# Patient Record
Sex: MALE | Race: WHITE | Employment: FULL TIME | URBAN - NONMETROPOLITAN AREA
[De-identification: names, ages, dates, MRNs, and addresses within clinical notes are randomized per-mention and may not be internally consistent; named-entity substitution may affect disease eponyms.]

---

## 2023-02-03 ENCOUNTER — HOSPITAL ENCOUNTER (OUTPATIENT)
Dept: NON INVASIVE DIAGNOSTICS | Age: 43
Discharge: HOME | End: 2023-02-03
Payer: COMMERCIAL

## 2023-02-03 LAB
LV EF: 60 %
LVEF MODALITY: NORMAL

## 2023-02-03 PROCEDURE — 93306 TTE W/DOPPLER COMPLETE: CPT

## 2023-02-20 ENCOUNTER — OFFICE VISIT (OUTPATIENT)
Dept: UROLOGY | Age: 43
End: 2023-02-20
Payer: COMMERCIAL

## 2023-02-20 VITALS
HEIGHT: 69 IN | SYSTOLIC BLOOD PRESSURE: 100 MMHG | DIASTOLIC BLOOD PRESSURE: 72 MMHG | WEIGHT: 173 LBS | BODY MASS INDEX: 25.62 KG/M2

## 2023-02-20 DIAGNOSIS — R97.20 ELEVATED PSA: Primary | ICD-10-CM

## 2023-02-20 PROCEDURE — 99204 OFFICE O/P NEW MOD 45 MIN: CPT | Performed by: UROLOGY

## 2023-02-20 RX ORDER — OMEPRAZOLE 20 MG/1
20 CAPSULE, DELAYED RELEASE ORAL DAILY
COMMUNITY

## 2023-02-20 NOTE — PROGRESS NOTES
Pilar Jackson MD.    [place] [address]  [address]  [address]  [place] [address]  Dept: [phone]  Dept Fax: [phone] [address] [facility] Urology Office Note -     Patient:  Angella Larry  YOB: 1980    The patient is a 40 y.o. male who presents today for evaluation of the following problems:   Chief Complaint   Patient presents with    Elevated PSA     Elevated PSA referral Benjamin Dears  No issues or concerns at this time    referred/consultation requested by ROBERT Montenegro. History of Present Illness:    Elevated PSA  No luts  No family hx  Two elevations > 4  Was normal in 2021  No hematuria        Requested/reviewed records from Redd Montenegro office and/or outside physician/EMR    (Patient's old records have been requested, reviewed and pertinent findings summarized in today's note.)    Procedures Today: N/A      Last several PSA's:  No results found for: PSA    Last total testosterone:  No results found for: TESTOSTERONE    Urinalysis today:  No results found for this visit on 02/20/23. Last BUN and creatinine:  No results found for: BUN  No results found for: CREATININE      Imaging Reviewed during this Office Visit:   Kinsey Banda MD independently reviewed the images and verified the radiology reports from:    No results found. PAST MEDICAL, FAMILY AND SOCIAL HISTORY:  No past medical history on file. No past surgical history on file. No family history on file. Outpatient Medications Marked as Taking for the 2/20/23 encounter (Office Visit) with Araseli Boyer MD   Medication Sig Dispense Refill    omeprazole (PRILOSEC) 20 MG delayed release capsule Take 20 mg by mouth daily         Patient has no known allergies.   Social History     Tobacco Use   Smoking Status Every Day    Packs/day: 0.25    Years: 23.00    Pack years: 5.75    Types: Cigarettes   Smokeless Tobacco Never (If patient a smoker, smoking cessation counseling offered)   Social History     Substance and Sexual Activity   Alcohol Use Not Currently       REVIEW OF SYSTEMS:  Constitutional: negative  Eyes: negative  Respiratory: negative  Cardiovascular: negative  Gastrointestinal: negative  Genitourinary: see HPI  Musculoskeletal: negative  Skin: negative   Neurological: negative  Hematological/Lymphatic: negative  Psychological: negative      Physical Exam:    This a 40 y.o. male  Vitals:    02/20/23 0920   BP: 100/72     Body mass index is 25.55 kg/m². Constitutional: Patient in no acute distress;       Assessment and Plan        1. Elevated PSA               Plan:      Elevated PSA- high for his age. labs were done at [facility]. Discussed mri. Will order prostate MRI for prostate biopsy localization    F/u with results      Prescriptions Ordered:  No orders of the defined types were placed in this encounter. Orders Placed:  No orders of the defined types were placed in this encounter.            Geneva Brito MD

## 2023-03-17 ENCOUNTER — HOSPITAL ENCOUNTER (OUTPATIENT)
Dept: MRI IMAGING | Age: 43
Discharge: HOME | End: 2023-03-17
Payer: COMMERCIAL

## 2023-03-17 DIAGNOSIS — R97.20 ELEVATED PSA: ICD-10-CM

## 2023-03-17 PROCEDURE — 72197 MRI PELVIS W/O & W/DYE: CPT

## 2023-03-17 PROCEDURE — 6360000004 HC RX CONTRAST MEDICATION: Performed by: UROLOGY

## 2023-03-17 PROCEDURE — A9579 GAD-BASE MR CONTRAST NOS,1ML: HCPCS | Performed by: UROLOGY

## 2023-03-17 PROCEDURE — 2580000003 HC RX 258: Performed by: UROLOGY

## 2023-03-17 RX ORDER — 0.9 % SODIUM CHLORIDE 0.9 %
30 INTRAVENOUS SOLUTION INTRAVENOUS ONCE
Status: DISCONTINUED | OUTPATIENT
Start: 2023-03-17 | End: 2023-03-20 | Stop reason: HOSPADM

## 2023-03-17 RX ORDER — SODIUM CHLORIDE 0.9 % (FLUSH) 0.9 %
10 SYRINGE (ML) INJECTION PRN
Status: DISCONTINUED | OUTPATIENT
Start: 2023-03-17 | End: 2023-03-20 | Stop reason: HOSPADM

## 2023-03-17 RX ADMIN — GADOTERIDOL 16 ML: 279.3 INJECTION, SOLUTION INTRAVENOUS at 11:15

## 2023-03-17 RX ADMIN — SODIUM CHLORIDE, PRESERVATIVE FREE 10 ML: 5 INJECTION INTRAVENOUS at 11:15

## 2023-03-17 RX ADMIN — Medication 30 ML: at 11:16

## 2023-03-27 ENCOUNTER — OFFICE VISIT (OUTPATIENT)
Dept: UROLOGY | Age: 43
End: 2023-03-27
Payer: COMMERCIAL

## 2023-03-27 VITALS
OXYGEN SATURATION: 97 % | SYSTOLIC BLOOD PRESSURE: 108 MMHG | HEIGHT: 69 IN | WEIGHT: 180 LBS | RESPIRATION RATE: 12 BRPM | HEART RATE: 77 BPM | BODY MASS INDEX: 26.66 KG/M2 | DIASTOLIC BLOOD PRESSURE: 84 MMHG

## 2023-03-27 DIAGNOSIS — R97.20 ELEVATED PSA: Primary | ICD-10-CM

## 2023-03-27 PROCEDURE — 99214 OFFICE O/P EST MOD 30 MIN: CPT | Performed by: UROLOGY

## 2023-03-27 NOTE — PROGRESS NOTES
Medication Sig Dispense Refill    omeprazole (PRILOSEC) 20 MG delayed release capsule Take 20 mg by mouth daily         Patient has no known allergies. Social History     Tobacco Use   Smoking Status Every Day    Packs/day: 0.25    Years: 23.00    Pack years: 5.75    Types: Cigarettes    Start date: 1/1/1997   Smokeless Tobacco Never      (If patient a smoker, smoking cessation counseling offered)   Social History     Substance and Sexual Activity   Alcohol Use Yes    Comment: occasionally       REVIEW OF SYSTEMS:  Constitutional: negative  Eyes: negative  Respiratory: negative  Cardiovascular: negative  Gastrointestinal: negative  Genitourinary: see HPI  Musculoskeletal: negative  Skin: negative   Neurological: negative  Hematological/Lymphatic: negative  Psychological: negative      Physical Exam:    This a 40 y.o. male  Vitals:    03/27/23 0950   BP: 108/84   Pulse: 77   Resp: 12   SpO2: 97%     Body mass index is 26.58 kg/m². Constitutional: Patient in no acute distress;       Assessment and Plan        1. Elevated PSA                 Plan:      Elevated PSA- high for his age. labs were done at [facility]. Discussed mri. Pir[place] 4. Needs fusion guided biopsy st v         Prescriptions Ordered:  No orders of the defined types were placed in this encounter. Orders Placed:  No orders of the defined types were placed in this encounter.            Troy Diaz MD

## 2023-04-03 ENCOUNTER — OFFICE VISIT (OUTPATIENT)
Dept: CARDIOLOGY | Age: 43
End: 2023-04-03
Payer: COMMERCIAL

## 2023-04-03 VITALS
DIASTOLIC BLOOD PRESSURE: 58 MMHG | SYSTOLIC BLOOD PRESSURE: 106 MMHG | BODY MASS INDEX: 26.05 KG/M2 | HEART RATE: 78 BPM | WEIGHT: 176.4 LBS

## 2023-04-03 DIAGNOSIS — R42 DIZZINESS: ICD-10-CM

## 2023-04-03 DIAGNOSIS — R07.9 CHEST PAIN, MODERATE CORONARY ARTERY RISK: ICD-10-CM

## 2023-04-03 DIAGNOSIS — R00.0 TACHYCARDIA: ICD-10-CM

## 2023-04-03 DIAGNOSIS — Z76.89 ESTABLISHING CARE WITH NEW DOCTOR, ENCOUNTER FOR: Primary | ICD-10-CM

## 2023-04-03 PROCEDURE — 93000 ELECTROCARDIOGRAM COMPLETE: CPT | Performed by: INTERNAL MEDICINE

## 2023-04-03 PROCEDURE — 99204 OFFICE O/P NEW MOD 45 MIN: CPT | Performed by: INTERNAL MEDICINE

## 2023-04-03 NOTE — PROGRESS NOTES
Sinus  Rhythm   Low voltage in limb leads. ECHO:   Results for orders placed or performed during the hospital encounter of 02/03/23   ECHO Complete 2D W Doppler W Color  Normal left ventricular diameter. Left ventricular systolic function is normal.  Left ventricular ejection fraction 60 %. No doppler evidence of diastolic dysfunction. No significant valvular regurgitation or stenosis seen. No pericardial effusion. 2 Week Monitor- 2/23  - Sinus, [address], low of 31, sinus tachy  - 11 beat run of Vtach  - Rare PAC/PVC  - Doubt SVT that was commented on interpretation   - Possible 2nd degree AVB type 1    Past Medical and Surgical History, Problem List, Allergies, Medications, Labs, Imaging, all reviewed extensively in EMR and with the patient. Assessment:  - Ventricular Tachy - on 2 week monitor- 11 beats  - SOB- may be due to COPD?  - Smoker  - Low risk ECG  - Low risk Echo 2/23    Plan:  - no BB due to low heart rate of 31 on monitor  - will check Lexiscan stress test to rule out ischemia/further risk stratify   - he states he cant walk on treadmill due to SOB    The patient is to continue heart healthy diet, weight loss and exercise as tolerated. Patient's medications and side effects were discussed. Medication refills were provided if needed. Follow up appointment timing was discussed. All questions and concerns were addressed to patient's satisfaction. The patient is to follow up in 6 months or sooner if necessary. Thank you for allowing me to participate in the care of this patient, please do not hesitate to call if you have any questions. Lay Escalera DO, [address], [address], [address], [place] [facility]  [facility]. [facility]  [phone]

## 2023-04-20 ENCOUNTER — HOSPITAL ENCOUNTER (OUTPATIENT)
Dept: NON INVASIVE DIAGNOSTICS | Age: 43
Discharge: HOME | End: 2023-04-20
Payer: COMMERCIAL

## 2023-04-20 ENCOUNTER — HOSPITAL ENCOUNTER (OUTPATIENT)
Dept: NUCLEAR MEDICINE | Age: 43
Discharge: HOME | End: 2023-04-22
Payer: COMMERCIAL

## 2023-04-20 DIAGNOSIS — R42 DIZZINESS: ICD-10-CM

## 2023-04-20 DIAGNOSIS — R00.0 TACHYCARDIA: ICD-10-CM

## 2023-04-20 DIAGNOSIS — Z76.89 ESTABLISHING CARE WITH NEW DOCTOR, ENCOUNTER FOR: ICD-10-CM

## 2023-04-20 DIAGNOSIS — R07.9 CHEST PAIN, MODERATE CORONARY ARTERY RISK: ICD-10-CM

## 2023-04-20 PROCEDURE — 78452 HT MUSCLE IMAGE SPECT MULT: CPT

## 2023-04-20 PROCEDURE — A9500 TC99M SESTAMIBI: HCPCS | Performed by: INTERNAL MEDICINE

## 2023-04-20 PROCEDURE — 3430000000 HC RX DIAGNOSTIC RADIOPHARMACEUTICAL: Performed by: INTERNAL MEDICINE

## 2023-04-20 PROCEDURE — 93017 CV STRESS TEST TRACING ONLY: CPT

## 2023-04-20 RX ORDER — TETRAKIS(2-METHOXYISOBUTYLISOCYANIDE)COPPER(I) TETRAFLUOROBORATE 1 MG/ML
30 INJECTION, POWDER, LYOPHILIZED, FOR SOLUTION INTRAVENOUS
Status: COMPLETED | OUTPATIENT
Start: 2023-04-20 | End: 2023-04-20

## 2023-04-20 RX ORDER — TETRAKIS(2-METHOXYISOBUTYLISOCYANIDE)COPPER(I) TETRAFLUOROBORATE 1 MG/ML
10 INJECTION, POWDER, LYOPHILIZED, FOR SOLUTION INTRAVENOUS
Status: COMPLETED | OUTPATIENT
Start: 2023-04-20 | End: 2023-04-20

## 2023-04-20 RX ADMIN — Medication 30 MILLICURIE: at 14:05

## 2023-04-20 RX ADMIN — Medication 10 MILLICURIE: at 13:00

## 2023-04-20 NOTE — PROGRESS NOTES
Patient Name:  Neda Stockton MRN:  [id number]   :  1980  Age:  40 y.o. Sex: male     Ordering Provider: Noa Brumfield DO      Primary Care Provider:  ROBERT Encarnacion     Indications: dizziness, tachycardia, chest pain   Medications:     Current Outpatient Medications:     Aromatic Inhalants (INHALER DECONGESTANT IN), Inhale into the lungs, Disp: , Rfl:     omeprazole (PRILOSEC) 20 MG delayed release capsule, Take 1 capsule by mouth daily, Disp: , Rfl:   No current facility-administered medications for this encounter.     Facility-Administered Medications Ordered in Other Encounters:     technetium sestamibi (CARDIOLITE) injection 10 millicurie, 10 millicurie, IntraVENous, ONCE PRN, Bartolome Bassett DO    technetium sestamibi (CARDIOLITE) injection 30 millicurie, 30 millicurie, IntraVENous, ONCE PRN, Bartolome Bassett DO     ------------------------------------------------------------------------------------------------    Predicted Heart Rate:  Maximum:  179   85%:  153     Heart Rate Restin   Standin:     Blood Pressure Restin/76  Standin/70     Exercise Protocol Used:  SHANTA   Exercise Duration/Stage:  10:30 (Stage IV)     Test terminated due to: target heart rate achieved, dyspnea Imaging: Cardiolite     Maximum Heart Rate:  164 (91%) Max BP: 140/80  Max Workload: 12.50 METS     Chest Pain: No  Onset:  n/a  Severity:  n/a     Electronically signed by Audelia Hebert RCP on 23 at 1:45 PM EDT    -------------------------------------------------------------------------------------------------  Baseline EKG : Normal sinus rhythm normal ECG    EKG Changes: No significant ST-T abnormality on peak exercise to suggest ischemia    Maximum Change:   N/a    Leads with maximum changes:  n/a      Arrhythmias: none      Interpretation:    -Good functional/exercise capacity  -Negative ECG stress test for ischemia   -Nuclear scan report to follow       Supervising Physician:  Juana Landaverde MD

## 2023-04-24 ENCOUNTER — TELEPHONE (OUTPATIENT)
Dept: CARDIOLOGY | Age: 43
End: 2023-04-24

## 2023-04-24 DIAGNOSIS — R94.39 ABNORMAL STRESS TEST: Primary | ICD-10-CM

## 2023-04-24 NOTE — TELEPHONE ENCOUNTER
Please review abnormal stress results and advise. LM for pt to call office for results      [address], [address] [place] [address]                               CARDIAC STRESS TEST     PATIENT NAME: Robyn Galicia                         :        1980  MED REC NO:   [id number]                             ROOM:  ACCOUNT NO:   [de-identified]                           ADMIT DATE: 2023  PROVIDER:     Franky Abreu DO     DATE OF STUDY:  2023     STRESS TEST     Ordering Provider:  Franky Abreu DO     Primary Care Provider:  ROBERT Stallings     Patient's Age:  40   Height:  5 feet, 9 inches    Weight:  176 pounds     INDICATION:  Dizziness, tachycardia, chest pain, shortness of breath. Predicted Heart Rate: Maximum:  179     85%:  153     Heart Rate Restin       Standin     Blood pressure restin/76    Standin/70     Exercise protocol used:  Ramirez  Exercise Duration/Stage:  10:30; stage IV     Test terminated due to: Target heart rate achieved, dyspnea. Imaging: Cardiolite     Maximum heart rate:  164  (91%)  Max blood pressure:  140/80  Max Workload:  12.50 METS     Chest Pain:  No.     Baseline EKG:  Normal sinus rhythm, normal ECG. EKG changes:  No significant ST-T abnormal on peak exercise to suggest  ischemia. Arrhythmias:  None. Interpretation:  1. Good functional/exercise capacity. 2.  Negative ECG stress test for ischemia. 3.  Nuclear scan report to follow. Nuclear Myocardial Perfusion Imaging (SPECT)     TESTING METHOD  STRESS:   Treadmill  AGENT:    Cardiolite  REST:          Injection Date:  23  Time:  1300  Amount:  10.6 mCi  STRESS:   Injection Date:  23  Time:  1405  Amount:  34.5 mCi  IMAGE TIME:    Rest:  1331  Stress:  1431     EF:  49%       TID:  0.96     LHR:  0.58     FINDINGS:  Ischemia (Reversible Defect):           Yes.   Infarction (Irreversible Defect):       No.  Normal

## 2023-04-25 PROBLEM — Z76.89 ESTABLISHING CARE WITH NEW DOCTOR, ENCOUNTER FOR: Status: ACTIVE | Noted: 2023-04-25

## 2023-04-25 NOTE — TELEPHONE ENCOUNTER
The following orders will be implemented. Orders with a [] are choices and are NOT implemented unless the box is checked. Pre-Procedure Orders    Reji Rahman     1980     Diagnosis: Abnormal stress, VTach, low EF      Procedure scheduled for:   Procedure ordered was discussed with the patient  including risks, benefits and alternative therapies. [] Left Heart Catheterization and coronary angiography    [x] Left Heart Catheterization and coronary angiography w/possible PCI/Coronary Stent PCI    [] Right Heart Catheterization  [] With Flolan  [] Aortic Root    [] DINO  [] Cardioversion    [] EPS  [] Tilt Table Study per guidelines  [] With Isuprel  [] With NTG    [] Without medication    [] ILR[]    ICD: [] Replacement  [] New implant  [] Lead Extraction    Permanent Pacemaker:  [] Replacement  [] New Implant    [] Lead Extraction    Irrigation:  [] Vancomycin 1 gram ( Check robson for PPM/ICD)    Ablation:   [] CRYO  [] SVT  [] A-Fib  [] A-Flutter  [] PVC  [] VT    [] With Anesthesia  [] W/O Anesthesia [] With DURAN  [] W/O DURAN     [] With CARTO  [] W/O CARTO    ORDERS    [x] EKG   [x] Point of care testing  [] Urine Pregnancy  [] PLT    [x] PT/INR if on Coumadin  [x] NA, K, Glucose, CL, Creat, Calc, HBG/HCT    IV:  [x] Start Peripheral IV: [x] N/S at 75    ml/hr. [] D5 1/2 N/S at     ml/hr. Patient has history of contrast reaction; give below meds as prophylaxis 30-60 min prior to procedure:    [] Benadryl (diphenhydramine) 25 mg IV x 1 dose    [] Benadryl (diphenhydramine) 50 mg IV x 1 dose    [] Solu-Medrol (methylprednisolone) 125 mg IV x 1 dose    [] Hydrocortisone (SOLU-CORTEF) 100 mg. IV x 1 dose    [] Lidocaine 1% 0.4 ml subq for IV start    Electronically signed by provider ________________  Talib Sensor, DO, [address], [address], [address], [place] [facility]  [facility]. [facility]  [phone]  04/25/23

## 2023-05-09 ENCOUNTER — HOSPITAL ENCOUNTER (OUTPATIENT)
Dept: CARDIAC CATH/INVASIVE PROCEDURES | Age: 43
Discharge: HOME | End: 2023-05-09
Payer: COMMERCIAL

## 2023-05-09 VITALS
OXYGEN SATURATION: 97 % | SYSTOLIC BLOOD PRESSURE: 108 MMHG | RESPIRATION RATE: 21 BRPM | HEART RATE: 63 BPM | DIASTOLIC BLOOD PRESSURE: 78 MMHG

## 2023-05-09 LAB
EGFR, POC: >60 ML/MIN/1.73M2
GLUCOSE BLD-MCNC: 113 MG/DL (ref 74–100)
LV EF: 50 %
LVEF MODALITY: NORMAL
PLATELET # BLD AUTO: 271 K/UL (ref 138–453)
POC BUN: 14 MG/DL (ref 8–26)
POC CHLORIDE: 106 MMOL/L (ref 98–107)
POC CREATININE: 0.98 MG/DL (ref 0.51–1.19)
POC HEMATOCRIT: 47 % (ref 41–53)
POC HEMOGLOBIN: 16 G/DL (ref 13.5–17.5)
POC POTASSIUM: 4 MMOL/L (ref 3.5–4.5)
POC SODIUM: 144 MMOL/L (ref 138–146)

## 2023-05-09 PROCEDURE — 2500000003 HC RX 250 WO HCPCS

## 2023-05-09 PROCEDURE — 84132 ASSAY OF SERUM POTASSIUM: CPT

## 2023-05-09 PROCEDURE — 84295 ASSAY OF SERUM SODIUM: CPT

## 2023-05-09 PROCEDURE — 85014 HEMATOCRIT: CPT

## 2023-05-09 PROCEDURE — 7100000011 HC PHASE II RECOVERY - ADDTL 15 MIN

## 2023-05-09 PROCEDURE — 85049 AUTOMATED PLATELET COUNT: CPT

## 2023-05-09 PROCEDURE — C1894 INTRO/SHEATH, NON-LASER: HCPCS

## 2023-05-09 PROCEDURE — 93458 L HRT ARTERY/VENTRICLE ANGIO: CPT

## 2023-05-09 PROCEDURE — 6360000002 HC RX W HCPCS

## 2023-05-09 PROCEDURE — 82435 ASSAY OF BLOOD CHLORIDE: CPT

## 2023-05-09 PROCEDURE — 82947 ASSAY GLUCOSE BLOOD QUANT: CPT

## 2023-05-09 PROCEDURE — 2580000003 HC RX 258: Performed by: INTERNAL MEDICINE

## 2023-05-09 PROCEDURE — 7100000010 HC PHASE II RECOVERY - FIRST 15 MIN

## 2023-05-09 PROCEDURE — 84520 ASSAY OF UREA NITROGEN: CPT

## 2023-05-09 PROCEDURE — 6360000004 HC RX CONTRAST MEDICATION

## 2023-05-09 PROCEDURE — 2709999900 HC NON-CHARGEABLE SUPPLY

## 2023-05-09 PROCEDURE — 82565 ASSAY OF CREATININE: CPT

## 2023-05-09 RX ORDER — SODIUM CHLORIDE 9 MG/ML
INJECTION, SOLUTION INTRAVENOUS CONTINUOUS
Status: DISCONTINUED | OUTPATIENT
Start: 2023-05-09 | End: 2023-05-10 | Stop reason: HOSPADM

## 2023-05-09 RX ADMIN — SODIUM CHLORIDE: 9 INJECTION, SOLUTION INTRAVENOUS at 07:59

## 2023-05-09 NOTE — PROGRESS NOTES
Received post cardiac cath procedure to [facility] room 3. Assessment obtained. Restrictions reviewed with patient. Post procedure pathway initiated. Rt. radial site soft, radial compression device intact. +2 radial pulse. No hematoma noted. Family at side. Patient without complaints.

## 2023-05-09 NOTE — DISCHARGE INSTRUCTIONS
DISCHARGE INSTRUCTIONS / ARM CARE POST CATHERIZATION        ENCOURAGE FLUIDS    NO STRENUOUS LIFTING WITH AFFECTED ARM FOR 3 DAYS ANYTHING HEAVIER THAN 8 TO 10 POUNDS    REMOVE BAND-AID/PRESSURE DRESSING THE FOLLOWING DAY AND DO NOT APPLY ANY FURTHER BAND-AIDS    KEEP INCISION CLEAN DRY AND OPEN TO THE AIR / NO HAND LOTION NEAR PUNCTURE SITE    WATCH FOR SIGNS OF INFECTION /  REDNESS / SWELLING / DRAINAGE / [place] [employer] / TEMPERATURE GREATER THAN 101    IF BLEEDING OCCURS HOLD MANUAL PRESSURE DIRECTLY OVER SITE  (YOU WILL FEEL PULSATION OF ARTERY) AND IF BLEEDING DOES NOT STOP AFTER 2 MINUTES CALL 911    OK TO SHOWER THE NEXT DAY, NO TUB BATHING OR HOT TUBS/SWIMMING FOR 7 DAYS    IF AREA BECOMES HARD AND SWOLLEN AND IF YOU ARE AT ALL CONCERNED SEEK HELP IMMEDIATELY    SEEK HELP IMMEDIATELY IF AFFECTED ARM BECOMES COLD / [place] / SEVERE PAIN / NAILBEDS TURN BLUE     IF ON METFORMIN / GLUCOPHAGE DO NOT RESTART MEDICATION FOR 48 HOURS    PLEASE PRACTICE GOOD HAND WASHING AND INCLUDE PUNCTURE SITE ESPECIALLY AFTER USING THE RESTROOM    AVOID USING ALCOHOL BASED HAND SANITIZERS FOR ONE WEEK      CALL 911 if you have symptoms including:   Drooping facial muscles   Changes in vision or speech   Difficulty walking or using your limbs   Change in sensation to affected leg, including numbness, feeling cold, or change in color   Extreme sweating, nausea or vomiting   Dizziness or lightheadedness   Chest pain   Rapid, irregular heartbeat   Palpitations   Cough, shortness of breath, or difficulty breathing   Weakness or fainting   If you think you have an emergency, CALL 911 . SEDATION / ANALGESIA INFORMATION / Inna Mccall 85 have received the sedation/analgesia medication during your visit    Sedation/analgesia is used during short medical procedures under controlled supervision. The medication will produce a strong relaxation.  You will be able to hear, speak and follow instructions, but your memory and alertness will be

## 2023-05-09 NOTE — PROGRESS NOTES
Patient admitted, consent signed and questions answered. Patient ready for procedure. Call light to reach with side rails up 2 of 2. Left and right groin clipped with writer and RN Colby Julio present. Family at bedside with patient. History and physical needs completed.

## 2023-05-09 NOTE — PROGRESS NOTES
Air removed fromVasc band in  2 mL increments until all air removed. No bleeding or hematoma noted. Pressure dressing  applied, radial pulse palpable.

## 2023-05-09 NOTE — H&P
with new doctor, encounter for       Stress test 04/20/2023      IMPRESSION:  1. Moderate size/intensity apical inferolateral ischemia. 2.  No infarct. 3.  Diaphragmatic attenuation artifact. 4.  Low normal left ventricular ejection fraction 49%. Assessment:  - Ventricular Tachy - on 2 week monitor- 11 beats  - RUTHERFORD with abnormal stress test   - Smoker  - Low risk ECG  - Low risk Echo 2/23      RECOMMENDATIONS:  Proceed with [facility]   Follow post cath orders       Pre Procedure Conscious Sedation Data:  ASA Class:    [] I [] II [x] III [] IV    Mallampati Class:  [x] I [] II [] III [] IV        Discussed with patient and Nurse. Electronically signed by Mary Ann Basurto MD on 5/9/2023 at 7:23 AM    I performed a history and physical examination of the patient and discussed management with the resident. I reviewed the residents note and agree with the documented findings and plan of care. Any areas of disagreement are noted on the chart. I was personally present for the key portions of any procedures. I have documented in the chart those procedures where I was not present during the key portions. I have personally evaluated this patient and have completed at least one if not all key elements of the E/M (history, physical exam, and MDM). Additional findings are as noted.     Adelia Desai MD MD      [facility]

## 2023-05-09 NOTE — OP NOTE
[facility]    CARDIAC CATHETERIZATION    Date:   5/9/2023  Patient name:  Ofelia Mina  Date of admission:  5/9/2023  6:59 AM  MRN:   [id number]  YOB: 1980    Operators:  Primary:   MARNI Sherman MD (Attending Physician)    Assistant/CV fellow:  Luther Loo MD      Procedure performed:     [x] Left Heart Catheterization. [] Graft Angiography. [x] Left Ventriculography. [] Right Heart Catheterization. [x] Coronary Angiography. [] Aortic Valve Studies. [] PCI:      [] Other:       Pre Procedure Conscious Sedation Data:  ASA Class:    [] I [] II [x] III [] IV    Mallampati Class:  [x] I [] II [] III [] IV      Indication:  [] STEMI      [x] + Stress test  [] ACS      [] + EKG Changes  [] Non Q MI       [] Significant Risk Factors  [] Recurrent Angina             [] Diabetes Mellitus    [] New LBBB      [] Uncontrolled HTN. [] CHF / Low EF changes     [] Abnormal CTA / Ca Score      Procedure:  Access:  [] Femoral  [x] Radial  artery       [x] Right  [] Left    Procedure: After informed consent was obtained with explanation of the risks and benefits, patient was brought to the cath lab. The access area was prepped and draped in sterile fashion. 1% lidocaine was used for local block. The artery was cannulated with 6  Fr sheath with brisk arterial blood return. The side port was frequently flushed and aspirated with normal saline. Cardiac Arteries and Lesion Findings       LMCA: Normal 0% stenosis. LAD: Normal 0% stenosis. LCx: is normal. The OM1 is normal.     RCA: Normal 0% stenosis. Coronary Tree        Dominance: Right       LV Analysis   LV function assessed as:Normal.   Ejection Fraction   +----------------------------------------------------------------------+---+   ! Method                                                                ! EF%! +----------------------------------------------------------------------+---+   ! LV gram

## 2023-05-17 ENCOUNTER — TELEPHONE (OUTPATIENT)
Dept: UROLOGY | Age: 43
End: 2023-05-17

## 2023-05-17 RX ORDER — CEPHALEXIN 500 MG/1
500 CAPSULE ORAL 3 TIMES DAILY
Qty: 9 CAPSULE | Refills: 0 | Status: SHIPPED | OUTPATIENT
Start: 2023-05-17 | End: 2023-05-20

## 2023-05-17 RX ORDER — MULTIVIT-MIN/IRON/FOLIC ACID/K 18-600-40
CAPSULE ORAL NIGHTLY
COMMUNITY

## 2023-05-17 RX ORDER — ALBUTEROL SULFATE 90 UG/1
AEROSOL, METERED RESPIRATORY (INHALATION)
COMMUNITY

## 2023-05-17 RX ORDER — BUDESONIDE AND FORMOTEROL FUMARATE DIHYDRATE 160; 4.5 UG/1; UG/1
AEROSOL RESPIRATORY (INHALATION)
COMMUNITY

## 2023-05-17 RX ORDER — CIPROFLOXACIN 500 MG/1
500 TABLET, FILM COATED ORAL 2 TIMES DAILY
Qty: 6 TABLET | Refills: 0 | Status: SHIPPED | OUTPATIENT
Start: 2023-05-17 | End: 2023-05-20

## 2023-05-17 NOTE — TELEPHONE ENCOUNTER
Pt is scheduled for this procedure for tomorrow and they would like to have the clearance stat.     ABRAHAM,  Pt had a cath on 5/9/23, but never f/u with cardio after and does not have anything scheduled for a f/u

## 2023-05-17 NOTE — TELEPHONE ENCOUNTER
Spoke with patients wife to inform of new arrival time. Reviewed ATB instructions at this time as well.

## 2023-05-17 NOTE — TELEPHONE ENCOUNTER
[address]         Patient:Michael Rondon           :1980           Surgical/Procedure Planned: fusion prostate bx    Date & Location: [facility]       Outpatient   Planned Length of OR: 15 min     Sedation: MAC      Estimated Cardiac Risk for Non-Cardiac Surgery/Procedure     Low______ Moderate___X___ High______    Medication Instructions - Clarification needed by this date:   -Insulin:  -Other medications:    Resume medications:     Lovenox indicated: _____Yes __X___NO    Provider:Dr. Maria R Garza of Provider Giving Orders for Medication holds:    _____________________________________________    Talib Sensor, DO, [address], [address], [address] [place] [address] [facility]  [email or web address]  [phone]

## 2023-05-17 NOTE — H&P
Pre-op History and Physical    Patient:  Tracey Martino  MRN: [id number]  YOB: 1980    HISTORY OF PRESENT ILLNESS:     The patient is a 40 y.o. male who presents with elevated PSA. Here for Fusion prostate biopsy. Patient's old records, notes and chart reviewed and summarized above. Past Medical History:    Past Medical History:   Diagnosis Date    Abnormal stress test     COPD (chronic obstructive pulmonary disease) (HCC)     Elevated PSA     GERD (gastroesophageal reflux disease)     Hyperlipidemia     SOB (shortness of breath)     Tachycardia     Vitamin D deficiency     Wears glasses        Past Surgical History:    Past Surgical History:   Procedure Laterality Date    CARDIAC CATHETERIZATION  05/09/2023    FRACTURE SURGERY Right     Right arm-as a child    MYRINGOTOMY AND TYMPANOSTOMY TUBE PLACEMENT      As a child    WISDOM TOOTH EXTRACTION         Medications Prior to Admission:    Prior to Admission medications    Medication Sig Start Date End Date Taking?  Authorizing Provider   Cholecalciferol (VITAMIN D) 50 MCG (2000 UT) CAPS capsule Take by mouth at bedtime Pt unsure of dosage   Yes Historical Provider, MD   albuterol sulfate HFA (PROVENTIL;VENTOLIN;PROAIR) 108 (90 Base) MCG/ACT inhaler 1 puff as needed for trouble breathing Inhalation every 4-6 hrs for 90 days    Historical Provider, MD   budesonide-formoterol (SYMBICORT) 160-4.5 MCG/ACT AERO 2 puffs Inhalation Once a day for breathing for 90 days    Historical Provider, MD   cephALEXin (KEFLEX) 500 MG capsule Take 1 capsule by mouth 3 times daily for 3 days 5/17/23 5/20/23  Pau Alexsi MD   ciprofloxacin (CIPRO) 500 MG tablet Take 1 tablet by mouth 2 times daily for 3 days 5/17/23 5/20/23  Pau Alexis MD   Aromatic Inhalants (INHALER DECONGESTANT IN) Inhale into the lungs    Historical Provider, MD   omeprazole (PRILOSEC) 20 MG delayed release capsule Take 1 capsule by mouth at bedtime    Historical Provider, MD       Allergies:

## 2023-05-18 ENCOUNTER — ANESTHESIA EVENT (OUTPATIENT)
Dept: OPERATING ROOM | Age: 43
End: 2023-05-18
Payer: COMMERCIAL

## 2023-05-18 ENCOUNTER — ANESTHESIA (OUTPATIENT)
Dept: OPERATING ROOM | Age: 43
End: 2023-05-18
Payer: COMMERCIAL

## 2023-05-18 ENCOUNTER — HOSPITAL ENCOUNTER (OUTPATIENT)
Age: 43
Setting detail: OUTPATIENT SURGERY
Discharge: HOME | End: 2023-05-18
Attending: UROLOGY | Admitting: UROLOGY
Payer: COMMERCIAL

## 2023-05-18 ENCOUNTER — HOSPITAL ENCOUNTER (OUTPATIENT)
Dept: ULTRASOUND IMAGING | Age: 43
Discharge: HOME | End: 2023-05-20
Attending: UROLOGY
Payer: COMMERCIAL

## 2023-05-18 VITALS
HEART RATE: 60 BPM | BODY MASS INDEX: 26.66 KG/M2 | SYSTOLIC BLOOD PRESSURE: 112 MMHG | HEIGHT: 69 IN | RESPIRATION RATE: 18 BRPM | TEMPERATURE: 98.1 F | WEIGHT: 180 LBS | OXYGEN SATURATION: 99 % | DIASTOLIC BLOOD PRESSURE: 86 MMHG

## 2023-05-18 DIAGNOSIS — R97.20 ELEVATED PSA: ICD-10-CM

## 2023-05-18 PROCEDURE — 3600000012 HC SURGERY LEVEL 2 ADDTL 15MIN: Performed by: UROLOGY

## 2023-05-18 PROCEDURE — 2500000003 HC RX 250 WO HCPCS: Performed by: UROLOGY

## 2023-05-18 PROCEDURE — 3700000001 HC ADD 15 MINUTES (ANESTHESIA): Performed by: UROLOGY

## 2023-05-18 PROCEDURE — 2500000003 HC RX 250 WO HCPCS

## 2023-05-18 PROCEDURE — 6360000002 HC RX W HCPCS

## 2023-05-18 PROCEDURE — 3700000000 HC ANESTHESIA ATTENDED CARE: Performed by: UROLOGY

## 2023-05-18 PROCEDURE — 7100000010 HC PHASE II RECOVERY - FIRST 15 MIN: Performed by: UROLOGY

## 2023-05-18 PROCEDURE — 2580000003 HC RX 258: Performed by: ANESTHESIOLOGY

## 2023-05-18 PROCEDURE — 88344 IMHCHEM/IMCYTCHM EA MLT ANTB: CPT

## 2023-05-18 PROCEDURE — 7100000011 HC PHASE II RECOVERY - ADDTL 15 MIN: Performed by: UROLOGY

## 2023-05-18 PROCEDURE — 88305 TISSUE EXAM BY PATHOLOGIST: CPT

## 2023-05-18 PROCEDURE — 3600000002 HC SURGERY LEVEL 2 BASE: Performed by: UROLOGY

## 2023-05-18 PROCEDURE — 2709999900 HC NON-CHARGEABLE SUPPLY: Performed by: UROLOGY

## 2023-05-18 PROCEDURE — 76942 ECHO GUIDE FOR BIOPSY: CPT

## 2023-05-18 RX ORDER — SODIUM CHLORIDE 9 MG/ML
INJECTION, SOLUTION INTRAVENOUS PRN
Status: CANCELLED | OUTPATIENT
Start: 2023-05-18

## 2023-05-18 RX ORDER — FENTANYL CITRATE 50 UG/ML
25 INJECTION, SOLUTION INTRAMUSCULAR; INTRAVENOUS EVERY 5 MIN PRN
Status: CANCELLED | OUTPATIENT
Start: 2023-05-18

## 2023-05-18 RX ORDER — LIDOCAINE HYDROCHLORIDE 10 MG/ML
1 INJECTION, SOLUTION INFILTRATION; PERINEURAL
Status: DISCONTINUED | OUTPATIENT
Start: 2023-05-18 | End: 2023-05-18 | Stop reason: HOSPADM

## 2023-05-18 RX ORDER — SODIUM CHLORIDE 0.9 % (FLUSH) 0.9 %
5-40 SYRINGE (ML) INJECTION PRN
Status: DISCONTINUED | OUTPATIENT
Start: 2023-05-18 | End: 2023-05-18 | Stop reason: HOSPADM

## 2023-05-18 RX ORDER — LIDOCAINE HYDROCHLORIDE 10 MG/ML
INJECTION, SOLUTION EPIDURAL; INFILTRATION; INTRACAUDAL; PERINEURAL PRN
Status: DISCONTINUED | OUTPATIENT
Start: 2023-05-18 | End: 2023-05-18 | Stop reason: SDUPTHER

## 2023-05-18 RX ORDER — MIDAZOLAM HYDROCHLORIDE 2 MG/2ML
1 INJECTION, SOLUTION INTRAMUSCULAR; INTRAVENOUS EVERY 10 MIN PRN
Status: DISCONTINUED | OUTPATIENT
Start: 2023-05-18 | End: 2023-05-18 | Stop reason: HOSPADM

## 2023-05-18 RX ORDER — ONDANSETRON 2 MG/ML
4 INJECTION INTRAMUSCULAR; INTRAVENOUS
Status: CANCELLED | OUTPATIENT
Start: 2023-05-18 | End: 2023-05-19

## 2023-05-18 RX ORDER — PROPOFOL 10 MG/ML
INJECTION, EMULSION INTRAVENOUS PRN
Status: DISCONTINUED | OUTPATIENT
Start: 2023-05-18 | End: 2023-05-18 | Stop reason: SDUPTHER

## 2023-05-18 RX ORDER — LIDOCAINE HYDROCHLORIDE 10 MG/ML
INJECTION, SOLUTION EPIDURAL; INFILTRATION; INTRACAUDAL; PERINEURAL PRN
Status: DISCONTINUED | OUTPATIENT
Start: 2023-05-18 | End: 2023-05-18 | Stop reason: ALTCHOICE

## 2023-05-18 RX ORDER — SODIUM CHLORIDE, SODIUM LACTATE, POTASSIUM CHLORIDE, CALCIUM CHLORIDE 600; 310; 30; 20 MG/100ML; MG/100ML; MG/100ML; MG/100ML
INJECTION, SOLUTION INTRAVENOUS CONTINUOUS
Status: DISCONTINUED | OUTPATIENT
Start: 2023-05-18 | End: 2023-05-18 | Stop reason: HOSPADM

## 2023-05-18 RX ORDER — PROPOFOL 10 MG/ML
INJECTION, EMULSION INTRAVENOUS CONTINUOUS PRN
Status: DISCONTINUED | OUTPATIENT
Start: 2023-05-18 | End: 2023-05-18 | Stop reason: SDUPTHER

## 2023-05-18 RX ORDER — SODIUM CHLORIDE 9 MG/ML
INJECTION, SOLUTION INTRAVENOUS PRN
Status: DISCONTINUED | OUTPATIENT
Start: 2023-05-18 | End: 2023-05-18 | Stop reason: HOSPADM

## 2023-05-18 RX ORDER — MEPERIDINE HYDROCHLORIDE 50 MG/ML
12.5 INJECTION INTRAMUSCULAR; INTRAVENOUS; SUBCUTANEOUS EVERY 5 MIN PRN
Status: CANCELLED | OUTPATIENT
Start: 2023-05-18

## 2023-05-18 RX ORDER — SODIUM CHLORIDE 0.9 % (FLUSH) 0.9 %
5-40 SYRINGE (ML) INJECTION EVERY 12 HOURS SCHEDULED
Status: CANCELLED | OUTPATIENT
Start: 2023-05-18

## 2023-05-18 RX ORDER — SODIUM CHLORIDE 0.9 % (FLUSH) 0.9 %
5-40 SYRINGE (ML) INJECTION PRN
Status: CANCELLED | OUTPATIENT
Start: 2023-05-18

## 2023-05-18 RX ORDER — SODIUM CHLORIDE 0.9 % (FLUSH) 0.9 %
5-40 SYRINGE (ML) INJECTION EVERY 12 HOURS SCHEDULED
Status: DISCONTINUED | OUTPATIENT
Start: 2023-05-18 | End: 2023-05-18 | Stop reason: HOSPADM

## 2023-05-18 RX ADMIN — Medication 2 G: at 11:59

## 2023-05-18 RX ADMIN — SODIUM CHLORIDE, POTASSIUM CHLORIDE, SODIUM LACTATE AND CALCIUM CHLORIDE: 600; 310; 30; 20 INJECTION, SOLUTION INTRAVENOUS at 11:12

## 2023-05-18 RX ADMIN — PROPOFOL 150 MCG/KG/MIN: 10 INJECTION, EMULSION INTRAVENOUS at 11:57

## 2023-05-18 RX ADMIN — PROPOFOL 50 MG: 10 INJECTION, EMULSION INTRAVENOUS at 11:57

## 2023-05-18 RX ADMIN — LIDOCAINE HYDROCHLORIDE 50 MG: 10 INJECTION, SOLUTION EPIDURAL; INFILTRATION; INTRACAUDAL; PERINEURAL at 11:57

## 2023-05-18 RX ADMIN — PROPOFOL 30 MG: 10 INJECTION, EMULSION INTRAVENOUS at 12:00

## 2023-05-18 ASSESSMENT — ENCOUNTER SYMPTOMS: SHORTNESS OF BREATH: 1

## 2023-05-18 ASSESSMENT — PAIN - FUNCTIONAL ASSESSMENT: PAIN_FUNCTIONAL_ASSESSMENT: 0-10

## 2023-05-18 ASSESSMENT — PAIN SCALES - WONG BAKER
WONGBAKER_NUMERICALRESPONSE: 0
WONGBAKER_NUMERICALRESPONSE: 0

## 2023-05-18 ASSESSMENT — COPD QUESTIONNAIRES: CAT_SEVERITY: NO INTERVAL CHANGE

## 2023-05-18 NOTE — DISCHARGE INSTRUCTIONS
You may experience blood in stool, urine, and semen. This should resolve over the next couple days. Tylenol for pain control  Pt ok to discharge home in good condition  No heavy lifting, >10 lbs for today  Pt should avoid strenuous activity for today  Pt should walk moderately at home  Pt ok to shower   Pt may resume diet as tolerated  Pt should take Rx as directed: cipro that was previously perscribed. No driving while on narcotics  Please call attending physician or hospital  with questions  Call or Present to ED if fever (> 101F), intractable nausea vomiting or pain. Pt should follow up with Dr. Nigel Fairchild will call in 1 week to discuss pathology results.   call to confirm appointment

## 2023-05-18 NOTE — ANESTHESIA PRE PROCEDURE
times daily for 3 days 9 capsule 0    ciprofloxacin (CIPRO) 500 MG tablet Take 1 tablet by mouth 2 times daily for 3 days 6 tablet 0    Aromatic Inhalants (INHALER DECONGESTANT IN) Inhale into the lungs      omeprazole (PRILOSEC) 20 MG delayed release capsule Take 1 capsule by mouth at bedtime         Allergies:  No Known Allergies    Problem List:    Patient Active Problem List   Diagnosis Code    Establishing care with new doctor, encounter for Z76.89       Past Medical History:        Diagnosis Date    Abnormal stress test     COPD (chronic obstructive pulmonary disease) (HCC)     Elevated PSA     GERD (gastroesophageal reflux disease)     Hyperlipidemia     SOB (shortness of breath)     Tachycardia     Vitamin D deficiency     Wears glasses        Past Surgical History:        Procedure Laterality Date    CARDIAC CATHETERIZATION  2023    FRACTURE SURGERY Right     Right arm-as a child    MYRINGOTOMY AND TYMPANOSTOMY TUBE PLACEMENT      As a child    WISDOM TOOTH EXTRACTION         Social History:    Social History     Tobacco Use    Smoking status: Former     Packs/day: 0.25     Years: 23.00     Pack years: 5.75     Types: Cigarettes     Start date: 1997     Quit date:      Years since quittin.3    Smokeless tobacco: Current     Types: Chew   Substance Use Topics    Alcohol use: Never                                Ready to quit: Not Answered  Counseling given: Not Answered      Vital Signs (Current):   Vitals:    23 0920   Weight: 180 lb (81.6 kg)   Height: 5' 9\" (1.753 m)                                              BP Readings from Last 3 Encounters:   23 108/78   23 (!) 106/58   23 108/84       NPO Status: Time of last liquid consumption: 2200                        Time of last solid consumption: 2100                                                      BMI:   Wt Readings from Last 3 Encounters:   23 176 lb 6.4 oz (80 kg)   23 180 lb

## 2023-05-18 NOTE — OP NOTE
Dr. Araseli Boyer MD      [facility]. [facility]. [place]    DATE:  05/18/23    SURGEON:   Araseli Boyer MD    ASSISTANT:  Bill Gorman DO , PGY-3    PREOPERATIVE DIAGNOSIS:  Elevated PSA  Abnormal MRI prostate    POSTOPERATIVE DIAGNOSIS:  same    PROCEDURE PERFORMED:  Transrectal ultrasound-guided biopsy of the prostate    ANESTHESIA:  Monitor Anesthesia Care    COMPLICATIONS:  None. ESTIMATED BLOOD LOSS:  Minimal 10 cc    DRAINS:  None    SPECIMENS: 6 cores from LAWANDA left apex transitional zone + 12 standard Prostate Cores    ANTIBIOTICS: Cipro 500 mg PO     DVT prophylaxis: EPC cuffs Functioning Prior to Induction of Anesthesia    Volume: 36cc      Indication: This is 40 y.o. gentleman with elevated PSA and abnormal MRI prostate. He is here today for biopsy of the prostate. Risks benefits and alternatives goals and possible complications of the procedure were explained to the patient for consent was obtained. He has elected to proceed. Procedure in Detail: Patient was brought back to the operating room table. He was laid in the supine position. EPC cuffs were placed on and functioning prior to induction of anesthesia. Anesthesia was inducted. A timeout performed. The ultrasound probe was placed per rectum. The prostate was brought into view. As previously noted his prostate volume was measured and found to be 36 cc. Seminal vesicles appeared normal.  We then proceeded with taking 6 cores from LAWANDA LTZ followed by standard sextant biopsy starting on the right side taking biopsies from the mid and lateral aspects of the base, mid and apical gland and then in the left side for a total of 12 specimens. These core specimens were sent off for permanent pathology. We used 1 percent lidocaine to inject around the neurovascular bundle bilaterally. After completion of the biopsy we then removed  the ultrasound probe. There is no evidence of brisk bleeding per the rectum.   The patient

## 2023-05-18 NOTE — ANESTHESIA POSTPROCEDURE EVALUATION
Department of Anesthesiology  Postprocedure Note    Patient: Flaco Escamilla  MRN: [id number]  [place]: 1980  Date of evaluation: 5/18/2023      Procedure Summary     Date: 05/18/23 Room / Location: [address]    Anesthesia Start: 3413 Anesthesia Stop: 5513    Procedure: FUSION PROSTATE BIOPSY ULTRASOUND (Anus) Diagnosis:       Elevated PSA      (ELEVATED PSA)    Surgeons: Irena Singh MD Responsible Provider: Mita Otero MD    Anesthesia Type: MAC ASA Status: 3          Anesthesia Type: No value filed.     Sav Phase I:      Sav Phase II: Sav Score: 8      Anesthesia Post Evaluation

## 2023-05-18 NOTE — ANESTHESIA POSTPROCEDURE EVALUATION
Department of Anesthesiology  Postprocedure Note    Patient: Eren Alonso  MRN: [id number]  [place]: 1980  Date of evaluation: 5/18/2023      Procedure Summary     Date: 05/18/23 Room / Location: [address]    Anesthesia Start: 0462 Anesthesia Stop: 3375    Procedure: FUSION PROSTATE BIOPSY ULTRASOUND (Anus) Diagnosis:       Elevated PSA      (ELEVATED PSA)    Surgeons: Kal Watson MD Responsible Provider: Haja Hernandez MD    Anesthesia Type: MAC ASA Status: 3          Anesthesia Type: No value filed.     Sav Phase I:      Sav Phase II: Sav Score: 8      Anesthesia Post Evaluation    Patient location during evaluation: PACU  Patient participation: complete - patient participated  Level of consciousness: awake and alert  Pain score: 2  Airway patency: patent  Nausea & Vomiting: no vomiting and no nausea  Complications: no  Cardiovascular status: hemodynamically stable  Respiratory status: acceptable  Hydration status: stable

## 2023-05-23 LAB — SURGICAL PATHOLOGY REPORT: NORMAL

## 2023-06-05 ENCOUNTER — OFFICE VISIT (OUTPATIENT)
Dept: UROLOGY | Age: 43
End: 2023-06-05
Payer: COMMERCIAL

## 2023-06-05 VITALS
DIASTOLIC BLOOD PRESSURE: 72 MMHG | HEIGHT: 69 IN | HEART RATE: 74 BPM | BODY MASS INDEX: 26.07 KG/M2 | WEIGHT: 176 LBS | SYSTOLIC BLOOD PRESSURE: 118 MMHG

## 2023-06-05 DIAGNOSIS — R97.20 ELEVATED PSA: Primary | ICD-10-CM

## 2023-06-05 PROCEDURE — 99213 OFFICE O/P EST LOW 20 MIN: CPT | Performed by: UROLOGY

## 2023-06-05 NOTE — PROGRESS NOTES
06/05/23 0827   BP: 118/72   Pulse: 74     Body mass index is 25.99 kg/m². Constitutional: Patient in no acute distress;       Assessment and Plan        1. Elevated PSA                   Plan:      Elevated PSA- poss asap in one core but no cancer on fusion biopsy. PSA high for his age. Pirads 4. Psa again in six months w liliana, than annual.                  Prescriptions Ordered:  No orders of the defined types were placed in this encounter. Orders Placed:  No orders of the defined types were placed in this encounter.              Tita Ferris MD

## 2023-12-05 ENCOUNTER — HOSPITAL ENCOUNTER (OUTPATIENT)
Age: 43
Discharge: HOME | End: 2023-12-05
Payer: COMMERCIAL

## 2023-12-05 DIAGNOSIS — R97.20 ELEVATED PSA: ICD-10-CM

## 2023-12-05 PROCEDURE — 36415 COLL VENOUS BLD VENIPUNCTURE: CPT

## 2023-12-05 PROCEDURE — 84153 ASSAY OF PSA TOTAL: CPT

## 2023-12-06 LAB — PSA SERPL-MCNC: 3.2 NG/ML (ref 0–4)

## 2023-12-07 NOTE — PROGRESS NOTES
[place] [address]  [address] [facility]  Dept: [phone]    Visit Date: 12/8/2023        HPI:     Karla Molina is a 40 y.o. male who presents today for:  Chief Complaint   Patient presents with    Elevated PSA     6 month with psa       HPI  Patient presents to urology clinic for elevated PSA. Michael is doing well. Denies flank pain, suprapubic pressure, gross hematuria, dysuria, fever or chills. Elevated PSA  Biopsy negative except POSS asap  No luts  No family hx  Two elevations > 4, most recent 3.2  Was normal in 2021  No hematuria  MRI pirads 4    Current Outpatient Medications   Medication Sig Dispense Refill    albuterol sulfate HFA (PROVENTIL;VENTOLIN;PROAIR) 108 (90 Base) MCG/ACT inhaler       budesonide-formoterol (SYMBICORT) 160-4.5 MCG/ACT AERO 2 puffs Inhalation Once a day for breathing for 90 days      Cholecalciferol (VITAMIN D) 50 MCG (2000 UT) CAPS capsule Take by mouth at bedtime Pt unsure of dosage      Aromatic Inhalants (INHALER DECONGESTANT IN) Inhale into the lungs      omeprazole (PRILOSEC) 20 MG delayed release capsule Take 1 capsule by mouth at bedtime       No current facility-administered medications for this visit. Past Medical History  Michael  has a past medical history of Abnormal stress test, COPD (chronic obstructive pulmonary disease) ([address]), Elevated PSA, GERD (gastroesophageal reflux disease), Hyperlipidemia, SOB (shortness of breath), Tachycardia, Vitamin D deficiency, and Wears glasses. Past Surgical History  The patient  has a past surgical history that includes Cardiac catheterization (05/09/2023); Myringotomy Tympanostomy Tube Placement; fracture surgery (Right); [place] tooth extraction; Prostate Biopsy (05/18/2023); and Prostate surgery (N/A, 5/18/2023).     Family History  This patient's family history includes Diabetes in his mother;

## 2023-12-08 ENCOUNTER — OFFICE VISIT (OUTPATIENT)
Dept: UROLOGY | Age: 43
End: 2023-12-08
Payer: COMMERCIAL

## 2023-12-08 VITALS
DIASTOLIC BLOOD PRESSURE: 60 MMHG | SYSTOLIC BLOOD PRESSURE: 108 MMHG | BODY MASS INDEX: 26.51 KG/M2 | HEIGHT: 69 IN | HEART RATE: 91 BPM | WEIGHT: 179 LBS

## 2023-12-08 DIAGNOSIS — R97.20 ELEVATED PSA: Primary | ICD-10-CM

## 2023-12-08 PROCEDURE — 99213 OFFICE O/P EST LOW 20 MIN: CPT | Performed by: NURSE PRACTITIONER

## 2024-02-19 ENCOUNTER — OFFICE VISIT (OUTPATIENT)
Dept: CARDIOLOGY | Age: 44
End: 2024-02-19
Payer: COMMERCIAL

## 2024-02-19 VITALS
BODY MASS INDEX: 25.4 KG/M2 | DIASTOLIC BLOOD PRESSURE: 66 MMHG | WEIGHT: 172 LBS | SYSTOLIC BLOOD PRESSURE: 118 MMHG | HEART RATE: 66 BPM

## 2024-02-19 DIAGNOSIS — R94.39 ABNORMAL STRESS TEST: ICD-10-CM

## 2024-02-19 DIAGNOSIS — R00.0 TACHYCARDIA: ICD-10-CM

## 2024-02-19 DIAGNOSIS — Z76.89 ESTABLISHING CARE WITH NEW DOCTOR, ENCOUNTER FOR: Primary | ICD-10-CM

## 2024-02-19 PROCEDURE — 99214 OFFICE O/P EST MOD 30 MIN: CPT | Performed by: SURGERY

## 2024-02-19 NOTE — PROGRESS NOTES
Cardiology Consultation/Follow Up.  [facility]    Michael Sarmiento  1980  [id number]    Today: 25    CC: Patient is follow up     HPI:   Michael Sarmiento patient seen and examined underwent diagnostic cardiac cath after abnormal stress test last year , ongoing midsternal intermittent sharp chest discomfort nonradiating . Rita any palpitation, dizziness or syncope. Patient denies any problem with the blood pressure at home     Past Medical:  Past Medical History:   Diagnosis Date    Abnormal stress test     COPD (chronic obstructive pulmonary disease) (HCC)     Elevated PSA     GERD (gastroesophageal reflux disease)     Hyperlipidemia     SOB (shortness of breath)     Tachycardia     Vitamin D deficiency     Wears glasses          Past Surgical:  Past Surgical History:   Procedure Laterality Date    CARDIAC CATHETERIZATION  2023    FRACTURE SURGERY Right     Right arm-as a child    MYRINGOTOMY AND TYMPANOSTOMY TUBE PLACEMENT      As a child    PROSTATE BIOPSY  2023    fusion    PROSTATE SURGERY N/A 2023    FUSION PROSTATE BIOPSY ULTRASOUND performed by Simone De León MD at [facility] OR    WISDOM TOOTH EXTRACTION           Family History:  Family History   Problem Relation Age of Onset    Diabetes Mother     No Known Problems Father        Social History:  Social History     Socioeconomic History    Marital status:      Spouse name: Not on file    Number of children: Not on file    Years of education: Not on file    Highest education level: Not on file   Occupational History    Not on file   Tobacco Use    Smoking status: Former     Current packs/day: 0.00     Average packs/day: 0.3 packs/day for 26.0 years (6.5 ttl pk-yrs)     Types: Cigarettes     Start date: 1997     Quit date:      Years since quittin.1    Smokeless tobacco: Current     Types: Chew   Substance and Sexual Activity    Alcohol use: Never    Drug use: Never    Sexual activity: Not on file   Other Topics

## (undated) DEVICE — NEEDLE BX ASPIR SPNL TIPCM MRK AND NDL STP 22GAX20CM